# Patient Record
Sex: FEMALE | Race: WHITE | NOT HISPANIC OR LATINO | URBAN - METROPOLITAN AREA
[De-identification: names, ages, dates, MRNs, and addresses within clinical notes are randomized per-mention and may not be internally consistent; named-entity substitution may affect disease eponyms.]

---

## 2017-06-14 ENCOUNTER — CARE COORDINATION (OUTPATIENT)
Dept: SURGERY | Facility: CLINIC | Age: 37
End: 2017-06-14

## 2017-06-14 NOTE — PROGRESS NOTES
"Patient interested in the gastric balloon. Appts scheduled for 6/15/17.    Pre-Intragastric Balloon Screening  Jessica Mckeon  Ht: 5'3\"  Wt: 180  BMI (30-40 to qualify): 31.9  Are you interested in the self pay option for $8,250.00?  yes  Insurance confirmation: BCBS  Are you willing to have us submit insurance claims for medications and visits? yes  Do you have any of the following (no or unknown to meet with surgeon):    y=yes, n=no, u=unknown, na=not applicable  _n_ prior gastrointestinal surgery  _n_ prior weight loss surgery  _n_ ulcers, irritation, inflammation or inflammatory disease of the GI tract (hx of ulcer symptoms > 3 yrs ago, H.Pylori treated, symptoms resolved).  _n_ problems with bleeding or blood clots  _n_ hepatic insufficiency, liver cirrhosis or problems with your liver  _n_ any structural abnormalities or congenital anomalies of the GI tract  _n_ a hiatal hernia > 5 cm or < 5 cm with severe reflux  _n_ problems swallowing (hx of foods getting stuck in back of throat > 1 yr ago, told it was reflux, currently not a problem).  _n_ severe motility disorder (or gastroparesis)  _n_ a stomach mass  _n_ allergic to silicone  _n_ any medical condition that would not permit an elective endoscopy  _n_ breastfeeding, pregnant or planning to become pregnant within 6 months of having the balloon  _n_ serious or uncontrolled psychiatric disorder that could compromise patient understanding or compliance with follow-up visits and removal of device after 6 months  _n_ alcoholism or drug addiction  Are you taking any of the following:  _n_ aspirin         ___per provider  ___not under medical supervision  _n_ anti-inflammatory agents (ie., NSAIDS)   ___per provider  ___not under medical supervision  _n_ anticoagulants (blood thinners)   ___per provider  ___not under medical supervision  _n_ other gastric irritants    ___per provider  ___not under medical supervision  _y_ medication to treat gastric reflux or " "heart burn   ___per provider  _x__not under medical supervision - takes < 1x/mo.  _y__Are you willing to take medication (called a proton pump inhibitor) during the time the balloon is in place that will decrease stomach acid?    Next Steps:  _x__Appt with Dr Randall. (NEW appt, write \"Interested in IntraGastric Balloon\" in the comments).  [place] in Epic and MyChart.  6/15/17 at 10:00am  _x__Appt with Dietician. 6/15/17 at 9:30am  Do pre-visit questionnaire for NEW visit.    ___Toni to email Twyla Henderson at [email or web address] with name, MRN and appt info of patient interested in the gastric balloon.  ___Patient to Call Twyla Henderson (Financial): [facility] in the morning at [phone] or [address] in the afternoon at [phone].  ___Call Edward to schedule, # [phone]  "

## 2017-06-15 ENCOUNTER — ALLIED HEALTH/NURSE VISIT (OUTPATIENT)
Dept: SURGERY | Facility: CLINIC | Age: 37
End: 2017-06-15

## 2017-06-15 ENCOUNTER — OFFICE VISIT (OUTPATIENT)
Dept: SURGERY | Facility: CLINIC | Age: 37
End: 2017-06-15

## 2017-06-15 VITALS
OXYGEN SATURATION: 95 % | BODY MASS INDEX: 31.16 KG/M2 | WEIGHT: 182.5 LBS | HEART RATE: 87 BPM | SYSTOLIC BLOOD PRESSURE: 132 MMHG | HEIGHT: 64 IN | TEMPERATURE: 98.2 F | DIASTOLIC BLOOD PRESSURE: 85 MMHG

## 2017-06-15 DIAGNOSIS — E66.811 OBESITY (BMI 30.0-34.9): Primary | ICD-10-CM

## 2017-06-15 RX ORDER — ACETAMINOPHEN 160 MG
4000 TABLET,DISINTEGRATING ORAL DAILY
COMMUNITY
Start: 2015-11-13

## 2017-06-15 RX ORDER — EPINEPHRINE 0.3 MG/.3ML
INJECTION SUBCUTANEOUS
COMMUNITY
Start: 2015-12-23

## 2017-06-15 RX ORDER — OMEPRAZOLE 40 MG/1
40 CAPSULE, DELAYED RELEASE ORAL DAILY
Qty: 30 CAPSULE | Refills: 1 | Status: SHIPPED | OUTPATIENT
Start: 2017-06-15 | End: 2018-02-15

## 2017-06-15 RX ORDER — CHLORAL HYDRATE 500 MG
900 CAPSULE ORAL DAILY
COMMUNITY

## 2017-06-15 NOTE — LETTER
"6/15/2017       RE: Jessica Mckeon  [address] [place] [address]     Dear Colleague,    Thank you for referring your patient, Jessica Mckeon, to the [facility] SURGICAL WEIGHT MANAGEMENT at [facility]. Please see a copy of my visit note below.    I had the pleasure of seeing Jessica Mckeon in my bariatric consultation clinic to consider intragastric balloon.    Jessica Mckeon is a 37 year old female who is obese.  Numerous attempts to lose weight have been without success. These have included calorie counting and exercise regimens.    Weight gain associated with prior pregnancy.  Currently at highest life weight.      Most recent weights:  Wt Readings from Last 4 Encounters:   06/15/17 182 lb 8 oz       Height: 5' 3.68\" Weight: 182 lb 8 oz Body mass index is 31.64 kg/(m^2).    Weight Classification = class I obesity    SYMPTOMS OF OBESITY: reviewed in weight loss surgery questionnaire (scanned).      There is no problem list on file for this patient.       History reviewed. No pertinent past medical history.    History reviewed. No pertinent surgical history.    Current Outpatient Prescriptions   Medication     Cholecalciferol (VITAMIN D3) 2000 UNITS CAPS     fish oil-omega-3 fatty acids 1000 MG capsule     EPINEPHrine (EPIPEN 2-KHANG) 0.3 MG/0.3ML injection     No current facility-administered medications for this visit.        Cats; No clinical screening - see comments; Pollen extract; and Liquid adhesive     Social History     Social History     Marital status:      Spouse name: N/A     Number of children: N/A     Years of education: N/A     Occupational History     Not on file.     Social History Main Topics     Smoking status: Never Smoker     Smokeless tobacco: Not on file     Alcohol use Not on file     Drug use: Not on file     Sexual activity: Not on file     Other Topics Concern     Not on file     Social History Narrative     No narrative on file        History " "reviewed. No pertinent family history.     PHYSICAL EXAMINATION  /85  Pulse 87  Temp 98.2  F (36.8  C) (Oral)  Ht 5' 3.68\"  Wt 182 lb 8 oz  SpO2 95%  BMI 31.64 kg/m2   Body mass index is 31.64 kg/(m^2).   NAD NCAT  HEENT mucous membranes moist; oral dentition adequate; neck supple; no thyromegaly  Lungs breathing unlabored  Heart no murmurs/gallops/rubs; RR  Abdomen soft; nontender; nondistended; protuberant; peripheral obesity; no hernia  Extremities warm/dry; no deformities  Skin no jaundice; no obvious lesions  Neurological CN 2-12 grossly intact; motor strength equal; sensation grossly intact      No orders of the defined types were placed in this encounter.        A/P:  37 year old female who is obese with BMI about 31 kg/m2.    Diagnosis: Class I obesity    1. Most interested in intragastric balloon intervention.  Reviewed weight loss expectations and reviewed transition plan after balloon at length.  I spoke with Jessica at length about the side effects also of the balloon.  2. Endoscopic approach.  3. Will need to see a registered dietician 1 time.  4. Patient understands that cash pay is necessary for this procedure, which is considered experimental by medical policy of most insurance companies.  5. Schedule \"endoscopic balloon implant\" at time of patient's choosing after she starts ppi therapy for two weeks and after financial clearance provided.  6. Omeprazole prescribed to [facility] pharmacy in [place]; prior authorization may be required for this.  An alternative is to use omeprazole 20 mg twice daily over the counter medication.    No orders of the defined types were placed in this encounter.    Karlos Randall MD      "

## 2017-06-15 NOTE — PROGRESS NOTES
"New Bariatric Nutrition Consultation Note    Jessica Mckeon is a 37 year old female presents today for a gastric ballon consultation. Pt referred by Dr. Randall (6/15/17)    Estimated body mass index is 31.64 kg/(m^2) as calculated from the following:    Height as of an earlier encounter on 6/15/17: 1.617 m (5' 3.68\").    Weight as of an earlier encounter on 6/15/17: 82.8 kg (182 lb 8 oz).      Nutrition history  See MD note for details.  B- cookies and tea(milk skim) protein bar(anthony) toast(butter)  Snack- cookies (3-4)  L- microwave lean cuisine or smart ones, veggie burger(bun & ketchup)  D- pasta, pizza, mexican food  Snacks- candy bar(stopped) chips(not in house anymore)  Frozen food- burrito, microwave dinners, veggie burgers  No milk, juice, pop, light beers 1-3 (3-4 times per week)     Nutrition Prescription  Clear and full liquids post gastric ballon (per MD)  Grams Protein: 60 (minimum)  Amount of Fluid: 48-64 oz    Nutrition Diagnosis  Food- and Nutrition-related knowledge deficit r/t lack of prior exposure to information AEB pt unable to verbalize main points of bariatric clear and low-fat full liquid diet.    Nutrition Intervention  Materials/education provided on clear and full liquid diet.  Diet Guidelines for Bariatric Surgery, Sources of Protein, Keeping Track of Your Fluids, list of recommended vitamin/mineral supplementation.  Patient's questions answered, pt provided with goals for diet advancement post gastric balloon and RD contact information.    Patient Understanding: good  Expected Compliance: good  Follow-Up Plans: 1 week post gastric balloon     Nutrition Goals  1. Follow the bariatric post-op diet advancement schedule:  - Bariatric clear liquid diet through post-op day 1.  - Bariatric low-fat full liquid diet on post-op days 2 through 13 (2 weeks).  2. Sip on 48-64 oz (or greater) fluids daily, recording intake to help stay on-track.  - Drink at least 2 oz of fluid every 30 min.  3. " Protein at each meal - goal of 60 gm per day  4. Three meals per day, avoid snacking    Follow-Up:  PRN    Time spent with patient: 45 minutes.  Unique Esteban RD, LD

## 2017-06-15 NOTE — MR AVS SNAPSHOT
MRN:[id number]                      After Visit Summary   6/15/2017    Jessica Mckeon    MRN: [id number]           Visit Information        Provider Department      6/15/2017 10:30 AM Flavia Ross RD M [facility] Surgical Weight Management        Care Instructions    Goals:   1. Follow the bariatric post-op diet advancement schedule:  - Bariatric clear liquid diet through post-op day 1.  - Bariatric low-fat full liquid diet on post-op days 2 through 13 (2 weeks).  2. Sip on 48-64 oz (or greater) fluids daily, recording intake to help stay on-track.  - Drink at least 2 oz of fluid every 30 min.  3. Protein at each meal - goal of 60 gm per day  4. Three meals per day, avoid snacking         [employer] Information     [employer] is an electronic gateway that provides easy, online access to your medical records. With [employer], you can request a clinic appointment, read your test results, renew a prescription or communicate with your care team.     To sign up for [employer] visit the website at [email or web address]   You will be asked to enter the access code listed below, as well as some personal information. Please follow the directions to create your username and password.     Your access code is: [email or web address]  Expires: 2017  6:30 AM     Your access code will  in 90 days. If you need help or a new code, please contact your [facility] or call [phone] for assistance.        Care EveryWhere ID     This is your Care EveryWhere ID. This could be used by other organizations to access your [place] medical records  [id number]

## 2017-06-15 NOTE — PROGRESS NOTES
"I had the pleasure of seeing Jessica Mckeon in my bariatric consultation clinic to consider intragastric balloon.    Jessica Mckeon is a 37 year old female who is obese.  Numerous attempts to lose weight have been without success. These have included calorie counting and exercise regimens.    Weight gain associated with prior pregnancy.  Currently at highest life weight.      Most recent weights:  Wt Readings from Last 4 Encounters:   06/15/17 182 lb 8 oz       Height: 5' 3.68\" Weight: 182 lb 8 oz Body mass index is 31.64 kg/(m^2).    Weight Classification = class I obesity    SYMPTOMS OF OBESITY: reviewed in weight loss surgery questionnaire (scanned).      There is no problem list on file for this patient.       History reviewed. No pertinent past medical history.    History reviewed. No pertinent surgical history.    Current Outpatient Prescriptions   Medication     Cholecalciferol (VITAMIN D3) 2000 UNITS CAPS     fish oil-omega-3 fatty acids 1000 MG capsule     EPINEPHrine (EPIPEN 2-KHANG) 0.3 MG/0.3ML injection     No current facility-administered medications for this visit.        Cats; No clinical screening - see comments; Pollen extract; and Liquid adhesive     Social History     Social History     Marital status:      Spouse name: N/A     Number of children: N/A     Years of education: N/A     Occupational History     Not on file.     Social History Main Topics     Smoking status: Never Smoker     Smokeless tobacco: Not on file     Alcohol use Not on file     Drug use: Not on file     Sexual activity: Not on file     Other Topics Concern     Not on file     Social History Narrative     No narrative on file        History reviewed. No pertinent family history.     PHYSICAL EXAMINATION  /85  Pulse 87  Temp 98.2  F (36.8  C) (Oral)  Ht 5' 3.68\"  Wt 182 lb 8 oz  SpO2 95%  BMI 31.64 kg/m2   Body mass index is 31.64 kg/(m^2).   NAD NCAT  HEENT mucous membranes moist; oral dentition adequate; neck " "supple; no thyromegaly  Lungs breathing unlabored  Heart no murmurs/gallops/rubs; RR  Abdomen soft; nontender; nondistended; protuberant; peripheral obesity; no hernia  Extremities warm/dry; no deformities  Skin no jaundice; no obvious lesions  Neurological CN 2-12 grossly intact; motor strength equal; sensation grossly intact      No orders of the defined types were placed in this encounter.        A/P:  37 year old female who is obese with BMI about 31 kg/m2.    Diagnosis: Class I obesity    1. Most interested in intragastric balloon intervention.  Reviewed weight loss expectations and reviewed transition plan after balloon at length.  I spoke with Jessica at length about the side effects also of the balloon.  2. Endoscopic approach.  3. Will need to see a registered dietician 1 time.  4. Patient understands that cash pay is necessary for this procedure, which is considered experimental by medical policy of most insurance companies.  5. Schedule \"endoscopic balloon implant\" at time of patient's choosing after she starts ppi therapy for two weeks and after financial clearance provided.  6. [facility]ole prescribed to [facility] in [place]; prior authorization may be required for this.  An alternative is to use omeprazole 20 mg twice daily over the counter medication.    No orders of the defined types were placed in this encounter.          Karlos Randall MD  Surgery  [phone] (hospital )  [phone] (clinic nurses)                  "

## 2017-06-15 NOTE — NURSING NOTE
"(   Chief Complaint   Patient presents with     Consult     Consult for Gastric Balloon.    )    ( Weight: 182 lb 8 oz )  ( Height: 5' 3.68\" )  ( BMI (Calculated): 31.71 )  ( Initial Weight: 182 lb 8 oz )  ( Cumulative weight loss (lbs): 0 )  (   )  (   )  ( Waist Circumference (cm): 109 cm )  (   )    ( BP: 132/85 )  (   )  ( Temp: 98.2  F (36.8  C) )  ( Temp src: Oral )  ( Pulse: 87 )  (   )  ( SpO2: 95 % )    ( There is no problem list on file for this patient.   )  (   Current Outpatient Prescriptions   Medication Sig Dispense Refill     Cholecalciferol (VITAMIN D3) 2000 UNITS CAPS Take 4,000 Units by mouth       fish oil-omega-3 fatty acids 1000 MG capsule Take 900 mg by mouth       EPINEPHrine (EPIPEN 2-KHANG) 0.3 MG/0.3ML injection       )  ( Diabetes Eval:    )    ( Pain Eval:  Data Unavailable )    ( Wound Eval:       )    (   History   Smoking Status     Never Smoker   Smokeless Tobacco     Not on file    )    ( Signed By:  Ty Watkins; Carly 15, 2017; 10:28 AM )                          "

## 2017-06-15 NOTE — MR AVS SNAPSHOT
After Visit Summary   6/15/2017    Jessica Mckeon    MRN: [id number]           Patient Information     Date Of Birth          1980        Visit Information        Provider Department      6/15/2017 10:00 AM Karlos Randall MD [facility] Surgical Weight Management        Today's Diagnoses     Obesity (BMI 30.0-34.9)    -  1      Care Instructions    ___Toni to email Twyla Brownen at [email or web address] with name, MRN and appt info of patient interested in the gastric balloon.  ___Patient to Call Twyla Henderson (Financial): [facility] in the morning at [phone] or [address] in the afternoon at [phone].  ___Call Edward to schedule, # [phone]          Follow-ups after your visit        Who to contact     Please call your clinic at [phone] to:    Ask questions about your health    Make or cancel appointments    Discuss your medicines    Learn about your test results    Speak to your doctor   If you have compliments or concerns about an experience at your clinic, or if you wish to file a complaint, please contact [facility] at [phone] or email us at [email or web address]         Additional Information About Your Visit        MyChart Information     [employer] is an electronic gateway that provides easy, online access to your medical records. With [employer], you can request a clinic appointment, read your test results, renew a prescription or communicate with your care team.     To sign up for [employer] visit the website at [email or web address]   You will be asked to enter the access code listed below, as well as some personal information. Please follow the directions to create your username and password.     Your access code is: [email or web address]  Expires: 2017  6:30 AM     Your access code will  in 90 days. If you need help or a new code, please contact your [facility] or call  "[phone] for assistance.        Care EveryWhere ID     This is your Care EveryWhere ID. This could be used by other organizations to access your [place] medical records  [id number]        Your Vitals Were     Pulse Temperature Height Pulse Oximetry BMI (Body Mass Index)       87 98.2  F (36.8  C) (Oral) 5' 3.68\" 95% 31.64 kg/m2        Blood Pressure from Last 3 Encounters:   06/15/17 132/85    Weight from Last 3 Encounters:   06/15/17 182 lb 8 oz              Today, you had the following     No orders found for display       Primary Care Provider Office Phone # Fax #    Jose Guadalupe Luevano [phone]       [facility] [address]  [facility] [address]        Thank you!     Thank you for choosing [facility] SURGICAL WEIGHT MANAGEMENT  for your care. Our goal is always to provide you with excellent care. Hearing back from our patients is one way we can continue to improve our services. Please take a few minutes to complete the written survey that you may receive in the mail after your visit with us. Thank you!             Your Updated Medication List - Protect others around you: Learn how to safely use, store and throw away your medicines at www.disposemymeds.org.          This list is accurate as of: 6/15/17 10:45 AM.  Always use your most recent med list.                   Brand Name Dispense Instructions for use    EPIPEN 2-KHANG 0.3 MG/0.3ML injection   Generic drug:  EPINEPHrine          fish oil-omega-3 fatty acids 1000 MG capsule      Take 900 mg by mouth       vitamin D3 2000 UNITS Caps      Take 4,000 Units by mouth         "

## 2017-06-15 NOTE — PATIENT INSTRUCTIONS
Goals:   1. Follow the bariatric post-op diet advancement schedule:  - Bariatric clear liquid diet through post-op day 1.  - Bariatric low-fat full liquid diet on post-op days 2 through 13 (2 weeks).  2. Sip on 48-64 oz (or greater) fluids daily, recording intake to help stay on-track.  - Drink at least 2 oz of fluid every 30 min.  3. Protein at each meal - goal of 60 gm per day  4. Three meals per day, avoid snacking

## 2017-06-15 NOTE — PATIENT INSTRUCTIONS
___Toni to email Twyla Henderson at [email or web address] with name, MRN and appt info of patient interested in the gastric balloon.  ___Patient to Call Twyla Henderson (Financial): [facility] in the morning at [phone] or [address] in the afternoon at [phone].  ___Call Edward to schedule, # [phone]

## 2017-07-07 RX ORDER — FERROUS SULFATE 325(65) MG
TABLET ORAL
COMMUNITY

## 2017-07-11 ENCOUNTER — ANESTHESIA EVENT (OUTPATIENT)
Dept: SURGERY | Facility: AMBULATORY SURGERY CENTER | Age: 37
End: 2017-07-11

## 2017-07-12 RX ORDER — ACETAMINOPHEN 325 MG/1
975 TABLET ORAL ONCE
Status: DISCONTINUED | OUTPATIENT
Start: 2017-07-12 | End: 2017-07-12 | Stop reason: CLARIF

## 2017-07-12 RX ORDER — GABAPENTIN 300 MG/1
300 CAPSULE ORAL ONCE
Status: DISCONTINUED | OUTPATIENT
Start: 2017-07-12 | End: 2017-07-12 | Stop reason: CLARIF

## 2017-07-13 ENCOUNTER — HOSPITAL ENCOUNTER (OUTPATIENT)
Facility: AMBULATORY SURGERY CENTER | Age: 37
End: 2017-07-13
Attending: SURGERY

## 2017-07-13 ENCOUNTER — ANESTHESIA (OUTPATIENT)
Dept: SURGERY | Facility: AMBULATORY SURGERY CENTER | Age: 37
End: 2017-07-13

## 2017-07-13 VITALS
OXYGEN SATURATION: 99 % | DIASTOLIC BLOOD PRESSURE: 88 MMHG | HEART RATE: 70 BPM | BODY MASS INDEX: 31.89 KG/M2 | WEIGHT: 180 LBS | RESPIRATION RATE: 16 BRPM | SYSTOLIC BLOOD PRESSURE: 130 MMHG | HEIGHT: 63 IN | TEMPERATURE: 97.2 F

## 2017-07-13 DIAGNOSIS — E66.09 OBESITY DUE TO EXCESS CALORIES, UNSPECIFIED OBESITY SEVERITY: Primary | ICD-10-CM

## 2017-07-13 LAB
HCG UR QL: NEGATIVE
INTERNAL QC OK POCT: YES

## 2017-07-13 DEVICE — IMPLANTABLE DEVICE: Type: IMPLANTABLE DEVICE | Site: STOMACH | Status: FUNCTIONAL

## 2017-07-13 RX ORDER — HYOSCYAMINE SULFATE 0.125 MG
0.125-0.25 TABLET ORAL EVERY 4 HOURS PRN
Qty: 50 TABLET | Refills: 1 | Status: SHIPPED | OUTPATIENT
Start: 2017-07-13 | End: 2018-02-13

## 2017-07-13 RX ORDER — SODIUM CHLORIDE, SODIUM LACTATE, POTASSIUM CHLORIDE, CALCIUM CHLORIDE 600; 310; 30; 20 MG/100ML; MG/100ML; MG/100ML; MG/100ML
INJECTION, SOLUTION INTRAVENOUS CONTINUOUS
Status: DISCONTINUED | OUTPATIENT
Start: 2017-07-13 | End: 2017-07-13 | Stop reason: HOSPADM

## 2017-07-13 RX ORDER — SODIUM CHLORIDE, SODIUM LACTATE, POTASSIUM CHLORIDE, CALCIUM CHLORIDE 600; 310; 30; 20 MG/100ML; MG/100ML; MG/100ML; MG/100ML
INJECTION, SOLUTION INTRAVENOUS CONTINUOUS
Status: DISCONTINUED | OUTPATIENT
Start: 2017-07-13 | End: 2017-07-14 | Stop reason: HOSPADM

## 2017-07-13 RX ORDER — ONDANSETRON 4 MG/1
4 TABLET, ORALLY DISINTEGRATING ORAL
Status: DISCONTINUED | OUTPATIENT
Start: 2017-07-13 | End: 2017-07-14 | Stop reason: HOSPADM

## 2017-07-13 RX ORDER — FENTANYL CITRATE 50 UG/ML
25-50 INJECTION, SOLUTION INTRAMUSCULAR; INTRAVENOUS
Status: DISCONTINUED | OUTPATIENT
Start: 2017-07-13 | End: 2017-07-13 | Stop reason: HOSPADM

## 2017-07-13 RX ORDER — PROPOFOL 10 MG/ML
INJECTION, EMULSION INTRAVENOUS CONTINUOUS PRN
Status: DISCONTINUED | OUTPATIENT
Start: 2017-07-13 | End: 2017-07-13

## 2017-07-13 RX ORDER — ONDANSETRON 4 MG/1
4 TABLET, ORALLY DISINTEGRATING ORAL EVERY 6 HOURS PRN
Qty: 50 TABLET | Refills: 1 | Status: SHIPPED | OUTPATIENT
Start: 2017-07-13 | End: 2018-02-13

## 2017-07-13 RX ORDER — DEXAMETHASONE SODIUM PHOSPHATE 4 MG/ML
INJECTION, SOLUTION INTRA-ARTICULAR; INTRALESIONAL; INTRAMUSCULAR; INTRAVENOUS; SOFT TISSUE PRN
Status: DISCONTINUED | OUTPATIENT
Start: 2017-07-13 | End: 2017-07-13

## 2017-07-13 RX ORDER — LIDOCAINE HYDROCHLORIDE 20 MG/ML
INJECTION, SOLUTION INFILTRATION; PERINEURAL PRN
Status: DISCONTINUED | OUTPATIENT
Start: 2017-07-13 | End: 2017-07-13

## 2017-07-13 RX ORDER — SCOLOPAMINE TRANSDERMAL SYSTEM 1 MG/1
PATCH, EXTENDED RELEASE TRANSDERMAL
Qty: 4 PATCH | Refills: 0 | Status: SHIPPED | OUTPATIENT
Start: 2017-07-13 | End: 2018-02-13

## 2017-07-13 RX ORDER — ONDANSETRON 4 MG/1
4 TABLET, ORALLY DISINTEGRATING ORAL EVERY 30 MIN PRN
Status: DISCONTINUED | OUTPATIENT
Start: 2017-07-13 | End: 2017-07-14 | Stop reason: HOSPADM

## 2017-07-13 RX ORDER — ONDANSETRON 2 MG/ML
INJECTION INTRAMUSCULAR; INTRAVENOUS PRN
Status: DISCONTINUED | OUTPATIENT
Start: 2017-07-13 | End: 2017-07-13

## 2017-07-13 RX ORDER — NALOXONE HYDROCHLORIDE 0.4 MG/ML
.1-.4 INJECTION, SOLUTION INTRAMUSCULAR; INTRAVENOUS; SUBCUTANEOUS
Status: DISCONTINUED | OUTPATIENT
Start: 2017-07-13 | End: 2017-07-14 | Stop reason: HOSPADM

## 2017-07-13 RX ORDER — GLYCOPYRROLATE 0.2 MG/ML
INJECTION, SOLUTION INTRAMUSCULAR; INTRAVENOUS PRN
Status: DISCONTINUED | OUTPATIENT
Start: 2017-07-13 | End: 2017-07-13

## 2017-07-13 RX ORDER — LIDOCAINE 40 MG/G
CREAM TOPICAL
Status: DISCONTINUED | OUTPATIENT
Start: 2017-07-13 | End: 2017-07-13 | Stop reason: HOSPADM

## 2017-07-13 RX ORDER — KETOROLAC TROMETHAMINE 30 MG/ML
30 INJECTION, SOLUTION INTRAMUSCULAR; INTRAVENOUS EVERY 6 HOURS PRN
Status: DISCONTINUED | OUTPATIENT
Start: 2017-07-13 | End: 2017-07-14 | Stop reason: HOSPADM

## 2017-07-13 RX ORDER — FENTANYL CITRATE 50 UG/ML
INJECTION, SOLUTION INTRAMUSCULAR; INTRAVENOUS PRN
Status: DISCONTINUED | OUTPATIENT
Start: 2017-07-13 | End: 2017-07-13

## 2017-07-13 RX ORDER — PROPOFOL 10 MG/ML
INJECTION, EMULSION INTRAVENOUS PRN
Status: DISCONTINUED | OUTPATIENT
Start: 2017-07-13 | End: 2017-07-13

## 2017-07-13 RX ORDER — MEPERIDINE HYDROCHLORIDE 25 MG/ML
12.5 INJECTION INTRAMUSCULAR; INTRAVENOUS; SUBCUTANEOUS
Status: DISCONTINUED | OUTPATIENT
Start: 2017-07-13 | End: 2017-07-14 | Stop reason: HOSPADM

## 2017-07-13 RX ORDER — ONDANSETRON 2 MG/ML
4 INJECTION INTRAMUSCULAR; INTRAVENOUS EVERY 30 MIN PRN
Status: DISCONTINUED | OUTPATIENT
Start: 2017-07-13 | End: 2017-07-14 | Stop reason: HOSPADM

## 2017-07-13 RX ADMIN — FENTANYL CITRATE 50 MCG: 50 INJECTION, SOLUTION INTRAMUSCULAR; INTRAVENOUS at 10:23

## 2017-07-13 RX ADMIN — PROPOFOL 150 MG: 10 INJECTION, EMULSION INTRAVENOUS at 10:21

## 2017-07-13 RX ADMIN — SODIUM CHLORIDE, SODIUM LACTATE, POTASSIUM CHLORIDE, CALCIUM CHLORIDE: 600; 310; 30; 20 INJECTION, SOLUTION INTRAVENOUS at 10:15

## 2017-07-13 RX ADMIN — ONDANSETRON 4 MG: 2 INJECTION INTRAMUSCULAR; INTRAVENOUS at 10:21

## 2017-07-13 RX ADMIN — GLYCOPYRROLATE 0.3 MG: 0.2 INJECTION, SOLUTION INTRAMUSCULAR; INTRAVENOUS at 10:26

## 2017-07-13 RX ADMIN — DEXAMETHASONE SODIUM PHOSPHATE 4 MG: 4 INJECTION, SOLUTION INTRA-ARTICULAR; INTRALESIONAL; INTRAMUSCULAR; INTRAVENOUS; SOFT TISSUE at 10:21

## 2017-07-13 RX ADMIN — PROPOFOL 300 MCG/KG/MIN: 10 INJECTION, EMULSION INTRAVENOUS at 10:22

## 2017-07-13 RX ADMIN — LIDOCAINE HYDROCHLORIDE 100 MG: 20 INJECTION, SOLUTION INFILTRATION; PERINEURAL at 10:21

## 2017-07-13 NOTE — ANESTHESIA POSTPROCEDURE EVALUATION
Patient: Jessica Mckeon    Procedure(s):  Upper Endoscopy with Intragastric Balloon Placement - Wound Class: II-Clean Contaminated    Diagnosis:Obesity  Diagnosis Additional Information: No value filed.    Anesthesia Type:  General, ETT    Note:  Anesthesia Post Evaluation    Patient location during evaluation: bedside  Patient participation: Able to fully participate in evaluation  Level of consciousness: awake  Pain management: adequate  Airway patency: patent  Cardiovascular status: acceptable  Respiratory status: acceptable  Hydration status: acceptable  PONV: controlled     Anesthetic complications: None          Last vitals:  Vitals:    07/13/17 0903 07/13/17 1047 07/13/17 1056   BP: 114/89 105/75 (!) 130/91   Pulse: 70     Resp: 16 16 16   Temp: 36.4  C (97.6  F) 36.2  C (97.2  F)    SpO2: 97% 93% 92%         Electronically Signed By: Sarbjit Hubbard MD, MD  July 13, 2017  11:06 AM

## 2017-07-13 NOTE — OP NOTE
"   [facility]    Operative Note    Pre-operative diagnosis: Obesity   Post-operative diagnosis same   Procedure: Procedure(s):  Upper Endoscopy with Intragastric Balloon Placement - Wound Class: II-Clean Contaminated   Surgeon: Surgeon(s) and Role:     * Karlos Randall MD - Primary       * Betsy Roth MD - resident assisting   Anesthesia: Monitor Anesthesia Care    Estimated blood loss: None   Drains: None   Specimens: * No specimens in log *   Findings: normal gastric anatomy.   Complications: None.   Implants: Intragastric balloon placed     COMORBIDITIES:   Past Medical History:   Diagnosis Date     Obesity      Uncomplicated asthma        OPERATIVE INDICATIONS: Jessica Mckeon is a 37 year old female who is obese and has attempted to lose weight numerous times without success.    Height: 160 cm (5' 3\") Weight: 81.6 kg (180 lb) Body mass index is 31.89 kg/(m^2)..  After understanding the risks and benefits of proceeding with EGD with intragastric balloon placement, she agreed to the procedure.    OPERATIVE PROCEDURE:    A timeout procedure was performed.  The patient was positioned supine under MAC anesthesia.  Oral bite block placed and endoscope (28Fr) introduced orally and advanced through esophagus, stomach, and to the jejunum.   Normal esophageal and gastric anatomy was identified. She appeared to have a small hiatal hernia.  The scope was withdrawn and the deflated intragastric balloon was inserted into her oralpharynx and down into her stomach. The scope was replaced and retroflexed after getting into the stomach. The balloon was filled with 700cc of sterile saline under visualization with the endoscope. The balloon insertion device was disconnected from the balloon and removed from the esophagus.The balloon was visualized from many angles and felt to sit well in the stomach.  After this, the gastroscope was withdrawn and the case concludded.  I was " present for all parts of this case.     Karlos Randall MD  Surgery  [phone] (hospital )  [phone] (clinic nurses)

## 2017-07-13 NOTE — IP AVS SNAPSHOT
MRN:[id number]                      After Visit Summary   7/13/2017    Jessica Mckeon    MRN: [id number]           Thank you!     Thank you for choosing [facility] for your care. Our goal is always to provide you with excellent care. Hearing back from our patients is one way we can continue to improve our services. Please take a few minutes to complete the written survey that you may receive in the mail after you visit with us. Thank you!        Patient Information     Date Of Birth          1980        About your hospital stay     You were admitted on:  July 13, 2017 You last received care in the:  [facility]    You were discharged on:  July 13, 2017       Who to Call     For medical emergencies, please call 911.  For non-urgent questions about your medical care, please call your primary care provider or clinic, [phone]  For questions related to your surgery, please call your surgery clinic        Attending Provider     Provider Specialty    Karlos Randall MD Surgery       Primary Care Provider Office Phone # Fax #    Jose Guadalupe Luevano [phone]      After Care Instructions     Diet Instructions       Clear liquid diet for 2 days then full liquid diet for 2 weeks            Discharge Instructions       Follow up appointment as instructed by Surgeon and or RN            Shower       Resume normal showering            Weight bearing status - As tolerated                 Your next 10 appointments already scheduled     Jul 20, 2017 10:30 AM CDT   NUTRITION VISIT with Flavia Ross RD   [facility] ([facility])    [address] [address] [phone]              Further instructions from your care team       [facility]  Home Care Following Anesthesia  For 24 hours after surgery:  1. Get plenty of rest.  A responsible  adult must stay with you for at least 24 hours after you leave the surgery center.  2. Do not drive or use heavy equipment.  If you have weakness or tingling, don't drive or use heavy equipment until this feeling goes away.   3. Do not drink alcohol.   4. Avoid strenuous or risky activities.  Ask for help when climbing stairs.  5. You may feel lightheaded.  IF so, sit for a few minutes before standing.  Have someone help you get up.   6. If you have nausea (feel sick to your stomach): Drink only clear liquids such as apple juice, ginger ale, broth or 7-Up.  Rest may also help.  Be sure to drink enough fluids.  Move to a regular diet as you feel able.   7. You may have a slight fever.  Call the doctor if your fever is over 100 F (37.7 C) (taken under the tongue) or lasts longer than 24 hours.  8. You may have a dry mouth, a sore throat, muscle aches or trouble sleeping. These should go away after 24 hours.  9. Do not make important or legal decisions.         Tips for taking pain medications  To get the best pain relief possible, remember these points:    Take pain medications as directed, before pain becomes severe.    Pain medication can upset your stomach: taking it with food may help.    Constipation is a common side effect of pain medication. Drink plenty of  fluids.    Eat foods high in fiber. Take a stool softener if recommended by your doctor or pharmacist.    Do not drink alcohol, drive or operate machinery while taking pain medications.    Ask about other ways to control pain, such as with heat, ice or relaxation.    Call a doctor for any of the followin. Signs of infection (fever, growing tenderness at the surgery site, a large amount of drainage or bleeding, severe pain, foul-smelling drainage, redness, swelling).  2. It has been over 8 to 10 hours since surgery and you are still not able to urinate (pass water).  3. Headache for over 24 hours.    Your doctor is:       Dr. Karlos Randall: [phone]  "               Or dial [phone] and ask for the resident on call for:  General Surgery  For emergency care, call the:  [facility]:  [phone] (TTY for hearing impaired: [phone])                Pending Results     No orders found from 2017 to 2017.            Admission Information     Date & Time Provider Department Dept. Phone    2017 Karlos Randall MD [facility] [phone]      Your Vitals Were     Blood Pressure Pulse Temperature Respirations Height Weight    130/91 70 97.2  F (36.2  C) (Temporal) 16 1.6 m (5' 3\") 81.6 kg (180 lb)    Last Period Pulse Oximetry BMI (Body Mass Index)             2017 92% 31.89 kg/m2         [employer] Information     [employer] is an electronic gateway that provides easy, online access to your medical records. With [employer], you can request a clinic appointment, read your test results, renew a prescription or communicate with your care team.     To sign up for [employer] visit the website at [email or web address]   You will be asked to enter the access code listed below, as well as some personal information. Please follow the directions to create your username and password.     Your access code is: [email or web address]  Expires: 2017  6:30 AM     Your access code will  in 90 days. If you need help or a new code, please contact your [facility] or call [phone] for assistance.        Care EveryWhere ID     This is your Care EveryWhere ID. This could be used by other organizations to access your [facility] medical records  [id number]        Equal Access to Services     APRIL CHAUDHARY : Hadii aad destiny colon Sogmali, waaxda luqadaha, qaybta kaalmada adeegyada, chantelle garber. So [facility] [phone].    ATENCIÓN: Si habla español, tiene a alejandro disposición servicios gratuitos de asistencia lingüística. Llame al [phone].    We comply with " applicable federal civil rights laws and [place] laws. We do not discriminate on the basis of race, color, national origin, age, disability sex, sexual orientation or gender identity.               Review of your medicines      START taking        Dose / Directions    hyoscyamine 0.125 MG tablet   Commonly known as:  ANASPAZ/LEVSIN   Used for:  Obesity due to excess calories, unspecified obesity severity        Dose:  0.125-0.25 mg   Take 1-2 tablets (125-250 mcg) by mouth every 4 hours as needed for cramping   Quantity:  50 tablet   Refills:  1       ondansetron 4 MG ODT tab   Commonly known as:  ZOFRAN-ODT   Used for:  Obesity due to excess calories, unspecified obesity severity        Dose:  4 mg   Take 1 tablet (4 mg) by mouth every 6 hours as needed for nausea Dissolve ON the tongue.   Quantity:  50 tablet   Refills:  1       scopolamine 72 hr patch   Commonly known as:  TRANSDERM   Used for:  Obesity due to excess calories, unspecified obesity severity        Apply 1 patch to hairless area behind one ear as needed for nausea.  Remove old patch and change every 3 days (72 hours).   Quantity:  4 patch   Refills:  0         CONTINUE these medicines which have NOT CHANGED        Dose / Directions    EPIPEN 2-KHANG 0.3 MG/0.3ML injection   Generic drug:  EPINEPHrine        Refills:  0       ferrous sulfate 325 (65 FE) MG tablet   Commonly known as:  IRON        Take by mouth daily (with breakfast)   Refills:  0       fish oil-omega-3 fatty acids 1000 MG capsule        Dose:  900 mg   Take 900 mg by mouth daily   Refills:  0       omeprazole 40 MG capsule   Commonly known as:  priLOSEC   Used for:  Obesity (BMI 30.0-34.9)        Dose:  40 mg   Take 1 capsule (40 mg) by mouth daily   Quantity:  30 capsule   Refills:  1       vitamin D3 2000 UNITS Caps        Dose:  4000 Units   Take 4,000 Units by mouth daily   Refills:  0            Where to get your medicines      These medications were sent to [facility]  [place], [place] - [address]  [address], [place] [address]    Hours:  TRANSPLANT PHONE NUMBER [phone] Phone:  [phone]     hyoscyamine 0.125 MG tablet    ondansetron 4 MG ODT tab    scopolamine 72 hr patch                Protect others around you: Learn how to safely use, store and throw away your medicines at www.disposemymeds.org.             Medication List: This is a list of all your medications and when to take them. Check marks below indicate your daily home schedule. Keep this list as a reference.      Medications           Morning Afternoon Evening Bedtime As Needed    EPIPEN 2-KHANG 0.3 MG/0.3ML injection   Generic drug:  EPINEPHrine                                ferrous sulfate 325 (65 FE) MG tablet   Commonly known as:  IRON   Take by mouth daily (with breakfast)                                fish oil-omega-3 fatty acids 1000 MG capsule   Take 900 mg by mouth daily                                hyoscyamine 0.125 MG tablet   Commonly known as:  ANASPAZ/LEVSIN   Take 1-2 tablets (125-250 mcg) by mouth every 4 hours as needed for cramping                                omeprazole 40 MG capsule   Commonly known as:  priLOSEC   Take 1 capsule (40 mg) by mouth daily                                ondansetron 4 MG ODT tab   Commonly known as:  ZOFRAN-ODT   Take 1 tablet (4 mg) by mouth every 6 hours as needed for nausea Dissolve ON the tongue.                                scopolamine 72 hr patch   Commonly known as:  TRANSDERM   Apply 1 patch to hairless area behind one ear as needed for nausea.  Remove old patch and change every 3 days (72 hours).                                vitamin D3 2000 UNITS Caps   Take 4,000 Units by mouth daily

## 2017-07-13 NOTE — ANESTHESIA PREPROCEDURE EVALUATION
Anesthesia Evaluation     . Pt has had prior anesthetic. Type: General    No history of anesthetic complications          ROS/MED HX    ENT/Pulmonary:     (+)Intermittent asthma , . .    Neurologic:  - neg neurologic ROS     Cardiovascular:  - neg cardiovascular ROS       METS/Exercise Tolerance:  >4 METS   Hematologic:  - neg hematologic  ROS       Musculoskeletal:  - neg musculoskeletal ROS       GI/Hepatic:  - neg GI/hepatic ROS       Renal/Genitourinary:  - ROS Renal section negative       Endo:  - neg endo ROS       Psychiatric:  - neg psychiatric ROS       Infectious Disease:  - neg infectious disease ROS       Malignancy:         Other:                     Physical Exam  Normal systems: dental    Airway   Mallampati: I  TM distance: >3 FB  Neck ROM: full    Dental     Cardiovascular   Rhythm and rate: regular and normal      Pulmonary    breath sounds clear to auscultation                    Anesthesia Plan      History & Physical Review  History and physical reviewed and following examination; no interval change.    ASA Status:  1 .    NPO Status:  > 6 hours    Plan for General and ETT with Intravenous induction. Maintenance will be TIVA.    PONV prophylaxis:  Ondansetron (or other 5HT-3) and Dexamethasone or Solumedrol       Postoperative Care  Postoperative pain management:  Oral pain medications and Multi-modal analgesia.      Consents  Anesthetic plan, risks, benefits and alternatives discussed with:  Patient..                          .

## 2017-07-13 NOTE — BRIEF OP NOTE
[facility]    Brief Operative Note    Pre-operative diagnosis: Obesity  Post-operative diagnosis same  Procedure: Procedure(s):  Upper Endoscopy with Intragastric Balloon Placement - Wound Class: II-Clean Contaminated  Surgeon: Surgeon(s) and Role:     * Karlos Randall MD - Primary  Assistant: genna serrato MD - surgery resident  Anesthesia: Monitor Anesthesia Care   Estimated blood loss: None  Drains: None  Specimens: * No specimens in log *  Findings:   normal gastric anatomy.  Complications: None.  Implants: Materials Involved:  Intragastric balloon filled to 700cc with saline  Grafts:  None.

## 2017-07-13 NOTE — IP AVS SNAPSHOT
[facility]    [address] [place] [address]    Phone:  [phone]    Fax:  [phone]                                       After Visit Summary   7/13/2017    Jessica Mckeon    MRN: [id number]           After Visit Summary Signature Page     I have received my discharge instructions, and my questions have been answered. I have discussed any challenges I see with this plan with the nurse or doctor.    ..........................................................................................................................................  Patient/Patient Representative Signature      ..........................................................................................................................................  Patient Representative Print Name and Relationship to Patient    ..................................................               ................................................  Date                                            Time    ..........................................................................................................................................  Reviewed by Signature/Title    ...................................................              ..............................................  Date                                                            Time

## 2017-07-13 NOTE — H&P
Bariatric Surgery History & Physical    Jessica Mckeon  MRN: [id number]  female  37 year old    Assessment & Plan:  36yo F w/ obesity here for upper endoscopy with intragastric balloon placement today performed by Dr. Randall. Same day procedure.    Chief Complaint:  Class I obesity    HPI:  Jessica Mckeon is a 37 year old female who is obese. Numerous attempts to lose weight have been without success. These have included calorie counting and exercise regimens. Weight gain associated with prior pregnancy. Currently at highest life weight. Saw Dr. Randall in clinic on 6/15/2017 and decided to pursue intragastric balloon intervention.    Past Surgical History:   Past Surgical History:   Procedure Laterality Date      SECTION      x 2       Past Medical History:   Past Medical History:   Diagnosis Date     Obesity      Uncomplicated asthma        Social History:   Social History   Substance Use Topics     Smoking status: Never Smoker     Smokeless tobacco: Not on file     Alcohol use Yes      Comment: rare       Family History: History reviewed. No pertinent family history.     Allergies:   Allergies   Allergen Reactions     Cats      Other reaction(s): Runny Nose     No Clinical Screening - See Comments      Other reaction(s): Runny Nose  Trees, grasses  Other reaction(s): Runny Nose     Pollen Extract      Other reaction(s): Runny Nose     Liquid Adhesive Rash       Active Medications:   Current Outpatient Prescriptions   Medication Sig Dispense Refill     ferrous sulfate (IRON) 325 (65 FE) MG tablet Take by mouth daily (with breakfast)       Cholecalciferol (VITAMIN D3) 2000 UNITS CAPS Take 4,000 Units by mouth daily        fish oil-omega-3 fatty acids 1000 MG capsule Take 900 mg by mouth daily        omeprazole (PRILOSEC) 40 MG capsule Take 1 capsule (40 mg) by mouth daily 30 capsule 1     EPINEPHrine (EPIPEN 2-KHANG) 0.3 MG/0.3ML injection          ROS:  Negative for: recent illnesses, nausea, vomiting,  "diarrhea, chest pain, shortness of breath, headaches  Patient feels healthy overall    Physical Examination:   Vital Signs: /89  Pulse 70  Temp 97.6  F (36.4  C) (Oral)  Resp 16  Ht 1.6 m (5' 3\")  Wt 81.6 kg (180 lb)  LMP 07/05/2017  SpO2 97%  BMI 31.89 kg/m2  GEN:  alert, healthy, no distress, well nourished  EENT: normal  Heart/Pulse: regular rate & rhythm, no murmurs and no gallops  Lungs/Chest: lungs clear to auscultation  Abdomen: abdomen soft, non-tender, no abnormal masses  Extremeties: no joint deformities, effusion, and inflammation      Arabella Polk MD/MPH  Integrated Plastic Surgery PGY1  "

## 2017-07-13 NOTE — ANESTHESIA CARE TRANSFER NOTE
Patient: Jessica Mckeon    Procedure(s):  Upper Endoscopy with Intragastric Balloon Placement - Wound Class: II-Clean Contaminated    Diagnosis: Obesity  Diagnosis Additional Information: No value filed.    Anesthesia Type:   General, ETT     Note:  Airway :Room Air  Patient transferred to:Phase II        Vitals: (Last set prior to Anesthesia Care Transfer)    CRNA VITALS  7/13/2017 1012 - 7/13/2017 1049      7/13/2017             EKG: Sinus rhythm                Electronically Signed By: LUIS A Quijano CRNA  July 13, 2017  10:49 AM

## 2017-07-24 ENCOUNTER — CARE COORDINATION (OUTPATIENT)
Dept: SURGERY | Facility: CLINIC | Age: 37
End: 2017-07-24

## 2017-07-24 NOTE — PROGRESS NOTES
Patient called and left message. She is having severe constipation and would like to know what she can take for this.     Called patient. Left message.     She can take Miralax or use a suppository. She has no restrictions as of now on her diet.

## 2017-10-01 ENCOUNTER — HEALTH MAINTENANCE LETTER (OUTPATIENT)
Age: 37
End: 2017-10-01

## 2018-01-12 DIAGNOSIS — Z98.84 BARIATRIC SURGERY STATUS: Primary | ICD-10-CM

## 2018-02-13 NOTE — OR NURSING
"PAC pre-op phone call done. Pt states she had her pre-op last week at [facility] in [place]. It was done by her  who is a physician. She said \"Don't worry about the pre-op. It's been done and faxed in to Dr Randall's office\". I explained I do not see it in the record. The pt requested that we not call [facility] and ask for the H&P record because of privacy issues. She said she will hand carry the H&P in on DOS.  "

## 2018-02-14 ENCOUNTER — ANESTHESIA EVENT (OUTPATIENT)
Dept: SURGERY | Facility: AMBULATORY SURGERY CENTER | Age: 38
End: 2018-02-14

## 2018-02-15 ENCOUNTER — ANESTHESIA (OUTPATIENT)
Dept: SURGERY | Facility: AMBULATORY SURGERY CENTER | Age: 38
End: 2018-02-15

## 2018-02-15 ENCOUNTER — SURGERY (OUTPATIENT)
Age: 38
End: 2018-02-15

## 2018-02-15 ENCOUNTER — HOSPITAL ENCOUNTER (OUTPATIENT)
Facility: AMBULATORY SURGERY CENTER | Age: 38
End: 2018-02-15
Attending: SURGERY
Payer: COMMERCIAL

## 2018-02-15 VITALS
HEIGHT: 64 IN | OXYGEN SATURATION: 98 % | SYSTOLIC BLOOD PRESSURE: 109 MMHG | BODY MASS INDEX: 25.61 KG/M2 | DIASTOLIC BLOOD PRESSURE: 70 MMHG | TEMPERATURE: 98.3 F | RESPIRATION RATE: 16 BRPM | WEIGHT: 150 LBS

## 2018-02-15 RX ORDER — ONDANSETRON 4 MG/1
4 TABLET, ORALLY DISINTEGRATING ORAL EVERY 30 MIN PRN
Status: DISCONTINUED | OUTPATIENT
Start: 2018-02-15 | End: 2018-02-16 | Stop reason: HOSPADM

## 2018-02-15 RX ORDER — FENTANYL CITRATE 50 UG/ML
25-50 INJECTION, SOLUTION INTRAMUSCULAR; INTRAVENOUS
Status: DISCONTINUED | OUTPATIENT
Start: 2018-02-15 | End: 2018-02-16 | Stop reason: HOSPADM

## 2018-02-15 RX ORDER — GABAPENTIN 300 MG/1
CAPSULE ORAL
COMMUNITY
Start: 2017-09-26

## 2018-02-15 RX ORDER — SODIUM CHLORIDE, SODIUM LACTATE, POTASSIUM CHLORIDE, CALCIUM CHLORIDE 600; 310; 30; 20 MG/100ML; MG/100ML; MG/100ML; MG/100ML
INJECTION, SOLUTION INTRAVENOUS CONTINUOUS PRN
Status: DISCONTINUED | OUTPATIENT
Start: 2018-02-15 | End: 2018-02-15

## 2018-02-15 RX ORDER — FENTANYL CITRATE 50 UG/ML
INJECTION, SOLUTION INTRAMUSCULAR; INTRAVENOUS PRN
Status: DISCONTINUED | OUTPATIENT
Start: 2018-02-15 | End: 2018-02-15

## 2018-02-15 RX ORDER — SODIUM CHLORIDE, SODIUM LACTATE, POTASSIUM CHLORIDE, CALCIUM CHLORIDE 600; 310; 30; 20 MG/100ML; MG/100ML; MG/100ML; MG/100ML
INJECTION, SOLUTION INTRAVENOUS CONTINUOUS
Status: DISCONTINUED | OUTPATIENT
Start: 2018-02-15 | End: 2018-02-16 | Stop reason: HOSPADM

## 2018-02-15 RX ORDER — FENTANYL CITRATE 50 UG/ML
25-50 INJECTION, SOLUTION INTRAMUSCULAR; INTRAVENOUS
Status: DISCONTINUED | OUTPATIENT
Start: 2018-02-15 | End: 2018-02-15 | Stop reason: HOSPADM

## 2018-02-15 RX ORDER — ONDANSETRON 2 MG/ML
4 INJECTION INTRAMUSCULAR; INTRAVENOUS EVERY 30 MIN PRN
Status: DISCONTINUED | OUTPATIENT
Start: 2018-02-15 | End: 2018-02-16 | Stop reason: HOSPADM

## 2018-02-15 RX ORDER — PROPOFOL 10 MG/ML
INJECTION, EMULSION INTRAVENOUS PRN
Status: DISCONTINUED | OUTPATIENT
Start: 2018-02-15 | End: 2018-02-15

## 2018-02-15 RX ORDER — LIDOCAINE 40 MG/G
CREAM TOPICAL
Status: DISCONTINUED | OUTPATIENT
Start: 2018-02-15 | End: 2018-02-15 | Stop reason: HOSPADM

## 2018-02-15 RX ORDER — NALOXONE HYDROCHLORIDE 0.4 MG/ML
.1-.4 INJECTION, SOLUTION INTRAMUSCULAR; INTRAVENOUS; SUBCUTANEOUS
Status: DISCONTINUED | OUTPATIENT
Start: 2018-02-15 | End: 2018-02-16 | Stop reason: HOSPADM

## 2018-02-15 RX ORDER — PROPOFOL 10 MG/ML
INJECTION, EMULSION INTRAVENOUS CONTINUOUS PRN
Status: DISCONTINUED | OUTPATIENT
Start: 2018-02-15 | End: 2018-02-15

## 2018-02-15 RX ORDER — HYDROMORPHONE HYDROCHLORIDE 1 MG/ML
.3-.5 INJECTION, SOLUTION INTRAMUSCULAR; INTRAVENOUS; SUBCUTANEOUS EVERY 10 MIN PRN
Status: DISCONTINUED | OUTPATIENT
Start: 2018-02-15 | End: 2018-02-16 | Stop reason: HOSPADM

## 2018-02-15 RX ORDER — DEXAMETHASONE SODIUM PHOSPHATE 10 MG/ML
INJECTION, SOLUTION INTRAMUSCULAR; INTRAVENOUS PRN
Status: DISCONTINUED | OUTPATIENT
Start: 2018-02-15 | End: 2018-02-15

## 2018-02-15 RX ORDER — LIDOCAINE HYDROCHLORIDE 20 MG/ML
INJECTION, SOLUTION INFILTRATION; PERINEURAL PRN
Status: DISCONTINUED | OUTPATIENT
Start: 2018-02-15 | End: 2018-02-15

## 2018-02-15 RX ORDER — ONDANSETRON 2 MG/ML
INJECTION INTRAMUSCULAR; INTRAVENOUS PRN
Status: DISCONTINUED | OUTPATIENT
Start: 2018-02-15 | End: 2018-02-15

## 2018-02-15 RX ORDER — MEPERIDINE HYDROCHLORIDE 25 MG/ML
12.5 INJECTION INTRAMUSCULAR; INTRAVENOUS; SUBCUTANEOUS
Status: DISCONTINUED | OUTPATIENT
Start: 2018-02-15 | End: 2018-02-16 | Stop reason: HOSPADM

## 2018-02-15 RX ADMIN — FENTANYL CITRATE 50 MCG: 50 INJECTION, SOLUTION INTRAMUSCULAR; INTRAVENOUS at 11:31

## 2018-02-15 RX ADMIN — PROPOFOL 60 MG: 10 INJECTION, EMULSION INTRAVENOUS at 11:32

## 2018-02-15 RX ADMIN — DEXAMETHASONE SODIUM PHOSPHATE 4 MG: 10 INJECTION, SOLUTION INTRAMUSCULAR; INTRAVENOUS at 11:35

## 2018-02-15 RX ADMIN — SODIUM CHLORIDE, SODIUM LACTATE, POTASSIUM CHLORIDE, CALCIUM CHLORIDE: 600; 310; 30; 20 INJECTION, SOLUTION INTRAVENOUS at 11:17

## 2018-02-15 RX ADMIN — ONDANSETRON 4 MG: 2 INJECTION INTRAMUSCULAR; INTRAVENOUS at 11:35

## 2018-02-15 RX ADMIN — LIDOCAINE HYDROCHLORIDE 70 MG: 20 INJECTION, SOLUTION INFILTRATION; PERINEURAL at 11:25

## 2018-02-15 RX ADMIN — PROPOFOL 140 MG: 10 INJECTION, EMULSION INTRAVENOUS at 11:25

## 2018-02-15 RX ADMIN — PROPOFOL 200 MCG/KG/MIN: 10 INJECTION, EMULSION INTRAVENOUS at 11:25

## 2018-02-15 RX ADMIN — FENTANYL CITRATE 50 MCG: 50 INJECTION, SOLUTION INTRAMUSCULAR; INTRAVENOUS at 11:36

## 2018-02-15 NOTE — DISCHARGE INSTRUCTIONS
[facility]  Home Care Following Anesthesia  For 24 hours after surgery:  1. Get plenty of rest.  A responsible adult must stay with you for at least 24 hours after you leave the surgery center.  2. Do not drive or use heavy equipment.  If you have weakness or tingling, don't drive or use heavy equipment until this feeling goes away.   3. Do not drink alcohol.   4. Avoid strenuous or risky activities.  Ask for help when climbing stairs.  5. You may feel lightheaded.  IF so, sit for a few minutes before standing.  Have someone help you get up.   6. If you have nausea (feel sick to your stomach): Drink only clear liquids such as apple juice, ginger ale, broth or 7-Up.  Rest may also help.  Be sure to drink enough fluids.  Move to a regular diet as you feel able.   7. You may have a slight fever.  Call the doctor if your fever is over 100 F (37.7 C) (taken under the tongue) or lasts longer than 24 hours.  8. You may have a dry mouth, a sore throat, muscle aches or trouble sleeping. These should go away after 24 hours.  9. Do not make important or legal decisions.   If you use hormonal birth control (such as the pill, patch, ring or implants):  You will need a second form of birth control for 7 days (condoms, a diaphragm or contraceptive foam).  While in the surgery center, you received a medicine called Sugammadex.  Hormonal birth control (such as the pill, patch, ring or implants) will not work as well for a week after taking this medicine.    Tips for taking pain medications  To get the best pain relief possible, remember these points:    Take pain medications as directed, before pain becomes severe.    Pain medication can upset your stomach: taking it with food may help.    Constipation is a common side effect of pain medication. Drink plenty of  fluids.    Eat foods high in fiber. Take a stool softener if recommended by your doctor or pharmacist.    Do not drink alcohol, drive  or operate machinery while taking pain medications.    Ask about other ways to control pain, such as with heat, ice or relaxation.    Tylenol/Acetaminophen Consumption  To help encourage the safe use of acetaminophen, the makers of TYLENOL  have lowered the maximum daily dose for single-ingredient Extra Strength TYLENOL  (acetaminophen) products sold in the [place] from 8 pills per day (4,000 mg) to 6 pills per day (3,000 mg). The dosing interval has also changed from 2 pills every 4-6 hours to 2 pills every 6 hours.    If you feel your pain relief is insufficient, you may take Tylenol/Acetaminophen in addition to your narcotic pain medication.     Be careful not to exceed 3,000 mg of Tylenol/Acetaminophen in a 24 hour period from all sources.    If you are taking extra strength Tylenol/acetaminophen (500 mg), the maximum dose is 6 tablets in 24 hours.    If you are taking regular strength acetaminophen (325 mg), the maximum dose is 9 tablets in 24 hours.    Call a doctor for any of the followin. Signs of infection (fever, growing tenderness at the surgery site, a large amount of drainage or bleeding, severe pain, foul-smelling drainage, redness, swelling).  2. It has been over 8 to 10 hours since surgery and you are still not able to urinate (pass water).  3. Headache for over 24 hours.  Your doctor is:  Dr. Karlos Randall, General Surgery: [phone]  Or dial [phone] and ask for the resident on call for:  General Surgery  For emergency care, call the:  [facility]:  [phone] (TTY for hearing impaired: [phone])

## 2018-02-15 NOTE — ANESTHESIA CARE TRANSFER NOTE
Patient: Jessica Mckeon    Procedure(s):  Upper Endoscopy with Removal of Intragastric Balloon - Wound Class: II-Clean Contaminated    Diagnosis: Treatment Completion  Diagnosis Additional Information: No value filed.    Anesthesia Type:   General, ETT     Note:  Airway :Face Mask  Patient transferred to:PACU  Comments: Uneventful transport to PACU; VSS; Report given to RN; Pt comfortable; IV patent: pt exchanging wellHandoff Report: Identifed the Patient, Identified the Reponsible Provider, Reviewed the pertinent medical history, Discussed the surgical course, Reviewed Intra-OP anesthesia mangement and issues during anesthesia, Set expectations for post-procedure period and Allowed opportunity for questions and acknowledgement of understanding      Vitals: (Last set prior to Anesthesia Care Transfer)    CRNA VITALS  2/15/2018 1129 - 2/15/2018 1206      2/15/2018             EKG: Sinus rhythm                Electronically Signed By: LUIS A Quijano CRNA  February 15, 2018  12:06 PM

## 2018-02-15 NOTE — IP AVS SNAPSHOT
MRN:[id number]                      After Visit Summary   2/15/2018    Jessica Mckeon    MRN: [id number]           Thank you!     Thank you for choosing [facility] for your care. Our goal is always to provide you with excellent care. Hearing back from our patients is one way we can continue to improve our services. Please take a few minutes to complete the written survey that you may receive in the mail after you visit with us. Thank you!        Patient Information     Date Of Birth          1980        About your hospital stay     You were admitted on:  February 15, 2018 You last received care in the:  [facility]    You were discharged on:  February 15, 2018       Who to Call     For medical emergencies, please call 911.  For non-urgent questions about your medical care, please call your primary care provider or clinic, [phone]  For questions related to your surgery, please call your surgery clinic        Attending Provider     Provider Specialty    Karlos Randall MD Surgery       Primary Care Provider Office Phone # Fax #    Jose Guadalupe Luevnao [phone]      Further instructions from your care team       [facility]  Home Care Following Anesthesia  For 24 hours after surgery:  1. Get plenty of rest.  A responsible adult must stay with you for at least 24 hours after you leave the surgery center.  2. Do not drive or use heavy equipment.  If you have weakness or tingling, don't drive or use heavy equipment until this feeling goes away.   3. Do not drink alcohol.   4. Avoid strenuous or risky activities.  Ask for help when climbing stairs.  5. You may feel lightheaded.  IF so, sit for a few minutes before standing.  Have someone help you get up.   6. If you have nausea (feel sick to your stomach): Drink only clear liquids such as apple juice, ginger ale, broth or 7-Up.  Rest may also help.  Be sure to  drink enough fluids.  Move to a regular diet as you feel able.   7. You may have a slight fever.  Call the doctor if your fever is over 100 F (37.7 C) (taken under the tongue) or lasts longer than 24 hours.  8. You may have a dry mouth, a sore throat, muscle aches or trouble sleeping. These should go away after 24 hours.  9. Do not make important or legal decisions.   If you use hormonal birth control (such as the pill, patch, ring or implants):  You will need a second form of birth control for 7 days (condoms, a diaphragm or contraceptive foam).  While in the surgery center, you received a medicine called Sugammadex.  Hormonal birth control (such as the pill, patch, ring or implants) will not work as well for a week after taking this medicine.    Tips for taking pain medications  To get the best pain relief possible, remember these points:    Take pain medications as directed, before pain becomes severe.    Pain medication can upset your stomach: taking it with food may help.    Constipation is a common side effect of pain medication. Drink plenty of  fluids.    Eat foods high in fiber. Take a stool softener if recommended by your doctor or pharmacist.    Do not drink alcohol, drive or operate machinery while taking pain medications.    Ask about other ways to control pain, such as with heat, ice or relaxation.    Tylenol/Acetaminophen Consumption  To help encourage the safe use of acetaminophen, the makers of TYLENOL  have lowered the maximum daily dose for single-ingredient Extra Strength TYLENOL  (acetaminophen) products sold in the [place] from 8 pills per day (4,000 mg) to 6 pills per day (3,000 mg). The dosing interval has also changed from 2 pills every 4-6 hours to 2 pills every 6 hours.    If you feel your pain relief is insufficient, you may take Tylenol/Acetaminophen in addition to your narcotic pain medication.     Be careful not to exceed 3,000 mg of Tylenol/Acetaminophen in a 24 hour period from all  "sources.    If you are taking extra strength Tylenol/acetaminophen (500 mg), the maximum dose is 6 tablets in 24 hours.    If you are taking regular strength acetaminophen (325 mg), the maximum dose is 9 tablets in 24 hours.    Call a doctor for any of the followin. Signs of infection (fever, growing tenderness at the surgery site, a large amount of drainage or bleeding, severe pain, foul-smelling drainage, redness, swelling).  2. It has been over 8 to 10 hours since surgery and you are still not able to urinate (pass water).  3. Headache for over 24 hours.  Your doctor is:  Dr. Karlos Randall, General Surgery: [phone]  Or dial [phone] and ask for the resident on call for:  General Surgery  For emergency care, call the:  [facility]:  [phone] (TTY for hearing impaired: [phone])    Pending Results     No orders found from 2018 to 2018.            Admission Information     Date & Time Provider Department Dept. Phone    2/15/2018 Karlos Randall MD [facility] [phone]      Your Vitals Were     Blood Pressure Temperature Respirations Height Weight Pulse Oximetry    107/70 97.5  F (36.4  C) (Temporal) 18 1.626 m (5' 4\") 68 kg (150 lb) 99%    BMI (Body Mass Index)                   25.75 kg/m2           InstaEDU Information     Insta[employer]U is an electronic gateway that provides easy, online access to your medical records. With InstaEDU, you can request a clinic appointment, read your test results, renew a prescription or communicate with your care team.     To sign up for Inst[employer]U visit the website at [email or web address]   You will be asked to enter the access code listed below, as well as some personal information. Please follow the directions to create your username and password.     Your access code is: [email or web address]  Expires: 2018  9:26 AM     Your access code will  in 90 days. If you need help or a new code, please " contact your [facility] or call [phone] for assistance.        Equal Access to Services     APRIL CHAUDHARY : Hadii don Aguirre, marcelina casillas, katieesequiel contrerasrobynmarianne orellana, chantelle rosalessaritaamara garber. So [facility] [phone].    ATENCIÓN: Si habla español, tiene a alejandro disposición servicios gratuitos de asistencia lingüística. Llame al [phone].    We comply with applicable federal civil rights laws and [place] laws. We do not discriminate on the basis of race, color, national origin, age, disability, sex, sexual orientation, or gender identity.               Review of your medicines      UNREVIEWED medicines. Ask your doctor about these medicines        Dose / Directions    EPIPEN 2-KHANG 0.3 MG/0.3ML injection 2-pack   Generic drug:  EPINEPHrine        Refills:  0       ferrous sulfate 325 (65 FE) MG tablet   Commonly known as:  IRON        Take by mouth daily (with breakfast)   Refills:  0       fish oil-omega-3 fatty acids 1000 MG capsule        Dose:  900 mg   Take 900 mg by mouth daily   Refills:  0       gabapentin 300 MG capsule   Commonly known as:  NEURONTIN        Take 1 capsule daily on day 1, then 1 capsule twice daily on day 2,then 1 capsule  3 times daily therafter.   Refills:  0       omeprazole 40 MG capsule   Commonly known as:  priLOSEC   Used for:  Obesity (BMI 30.0-34.9)        Dose:  40 mg   Take 1 capsule (40 mg) by mouth daily   Quantity:  30 capsule   Refills:  1       vitamin D3 2000 UNITS Caps        Dose:  4000 Units   Take 4,000 Units by mouth daily   Refills:  0                Protect others around you: Learn how to safely use, store and throw away your medicines at www.disposemymeds.org.             Medication List: This is a list of all your medications and when to take them. Check marks below indicate your daily home schedule. Keep this list as a reference.      Medications           Morning Afternoon Evening Bedtime As  Needed    EPIPEN 2-KHANG 0.3 MG/0.3ML injection 2-pack   Generic drug:  EPINEPHrine                                ferrous sulfate 325 (65 FE) MG tablet   Commonly known as:  IRON   Take by mouth daily (with breakfast)                                fish oil-omega-3 fatty acids 1000 MG capsule   Take 900 mg by mouth daily                                gabapentin 300 MG capsule   Commonly known as:  NEURONTIN   Take 1 capsule daily on day 1, then 1 capsule twice daily on day 2,then 1 capsule  3 times daily therafter.                                omeprazole 40 MG capsule   Commonly known as:  priLOSEC   Take 1 capsule (40 mg) by mouth daily                                vitamin D3 2000 UNITS Caps   Take 4,000 Units by mouth daily                                   Génesis Hawkins  [phone]

## 2018-02-15 NOTE — ANESTHESIA POSTPROCEDURE EVALUATION
Patient: Jessica Mckeon    Procedure(s):  Upper Endoscopy with Removal of Intragastric Balloon - Wound Class: II-Clean Contaminated    Diagnosis:Treatment Completion  Diagnosis Additional Information: No value filed.    Anesthesia Type:  General, ETT    Note:  Anesthesia Post Evaluation    Patient location during evaluation: Phase 2  Patient participation: Able to fully participate in evaluation  Level of consciousness: awake and alert  Pain management: adequate  Airway patency: patent  Cardiovascular status: acceptable and hemodynamically stable  Respiratory status: acceptable  Hydration status: acceptable  PONV: none     Anesthetic complications: None          Last vitals:  Vitals:    02/15/18 0908 02/15/18 1201 02/15/18 1215   BP: 108/74 104/75 107/70   Resp: 16 18    Temp: 36.3  C (97.4  F) 36.6  C (97.9  F) 36.4  C (97.5  F)   SpO2: 98% 100% 99%         Electronically Signed By: Damion Zapata MD  February 15, 2018  12:18 PM

## 2018-02-15 NOTE — IP AVS SNAPSHOT
[facility]    [address] [place] [id number]    Phone:  [phone]    Fax:  [phone]                                       After Visit Summary   2/15/2018    Jessica Mckeon    MRN: [id number]           After Visit Summary Signature Page     I have received my discharge instructions, and my questions have been answered. I have discussed any challenges I see with this plan with the nurse or doctor.    ..........................................................................................................................................  Patient/Patient Representative Signature      ..........................................................................................................................................  Patient Representative Print Name and Relationship to Patient    ..................................................               ................................................  Date                                            Time    ..........................................................................................................................................  Reviewed by Signature/Title    ...................................................              ..............................................  Date                                                            Time

## 2018-02-15 NOTE — OP NOTE
[facility]    Operative Note    Pre-operative diagnosis: Treatment Completion   Post-operative diagnosis * No post-op diagnosis entered *   Procedure: Procedure(s):  Upper Endoscopy with Removal of Intragastric Balloon - Wound Class: II-Clean Contaminated   Surgeon: Surgeon(s) and Role:     * Karlos Randall MD - Primary   Anesthesia: General    Estimated blood loss: 0 mL   Drains: None   Specimens: * No specimens in log *   Findings: None.   Complications: None.   Implants: None.       COMORBIDITIES:   Past Medical History:   Diagnosis Date     Obesity      Uncomplicated asthma        OPERATIVE INDICATIONS: Jessica Mckeon is a 38 year old female who has been obese and underwent prior ORBERA implant 6 months ago.    Wt Readings from Last 4 Encounters:   02/15/18 150 lb   07/13/17 180 lb   06/15/17 182 lb 8 oz       Risks and benefits of surgery were fully understood.    The need for removal of the balloon after 6 months was fully understood as well.    The high likelihood of side effects with the balloon in place were addressed beforehand.    OPERATIVE PROCEDURE:    A timeout procedure was performed.  The patient was positioned and general anesthesia with endotracheal tube was delivered by CRNA.  Oral bite block was placed and gastroscopy was performed; the intragastric balloon was noted and was intact.   I used the ORBERA tool to puncture the balloon and removed about 700cc saline under direct visualization.  The balloon deflated normally.  I used the fish hook forceps to grasp the balloon and pulled this out under direct visualization.  I had anesthesia take down the ETT cuff while removing through the nasopharynx.  The procedure was complete.    I was present for all critical parts of the procedure.    Karlos Randall MD  Surgery  [phone] (hospital )  [phone] (clinic nurses)

## 2018-02-15 NOTE — ANESTHESIA PREPROCEDURE EVALUATION
Anesthesia Evaluation     . Pt has had prior anesthetic. Type: General    No history of anesthetic complications          ROS/MED HX    ENT/Pulmonary:     (+)Intermittent asthma Treatment: Inhaler prn,  , . .    Neurologic:  - neg neurologic ROS     Cardiovascular:  - neg cardiovascular ROS   (+) ----. : . . . :. . No previous cardiac testing       METS/Exercise Tolerance:  >4 METS   Hematologic:  - neg hematologic  ROS       Musculoskeletal:  - neg musculoskeletal ROS       GI/Hepatic:     (+) GERD Asymptomatic on medication,       Renal/Genitourinary:  - ROS Renal section negative       Endo:  - neg endo ROS       Psychiatric:  - neg psychiatric ROS       Infectious Disease:  - neg infectious disease ROS       Malignancy:      - no malignancy   Other:    - neg other ROS                 Physical Exam  Normal systems: pulmonary and dental    Airway   Mallampati: II  TM distance: >3 FB  Neck ROM: full    Dental     Cardiovascular   Rhythm and rate: regular and normal      Pulmonary                        Lab / Radiology Results:   Reviewed current labs when avail, see EMR for details.      BMP:  No results for input(s): NA, POTASSIUM, CHLORIDE, CO2, BUN, CR, GLC, AYAH in the last 44244 hours.    Invalid input(s): MG    LFTs:   No results for input(s): PROTTOTAL, ALBUMIN, BILITOTAL, ALKPHOS, AST, ALT, BILIDIRECT in the last 05213 hours.    CBC:   No results for input(s): WBC, HGB, PLT in the last 77060 hours.    Coags:  No results for input(s): INR, PTT, FIBR in the last 61274 hours.    Blood Bank:  No results found for: ABO, RH, AS    Studies:  See EMR for current studies, reviewed when available.      Anesthesia Plan      History & Physical Review  History and physical reviewed and following examination; no interval change.    ASA Status:  2 .    NPO Status:  > 8 hours    Plan for General and ETT with Intravenous induction. Maintenance will be TIVA.    PONV prophylaxis:  Ondansetron (or other 5HT-3)        Postoperative Care  Postoperative pain management:  Multi-modal analgesia.      Consents  Anesthetic plan, risks, benefits and alternatives discussed with:  Patient.  Use of blood products discussed: No .   .      Damion Zapata MD  Anesthesiologist  10:28 AM  February 15, 2018                        .

## 2018-03-08 ENCOUNTER — CARE COORDINATION (OUTPATIENT)
Dept: SURGERY | Facility: CLINIC | Age: 38
End: 2018-03-08

## 2018-03-08 NOTE — PROGRESS NOTES
Patient notified that per Dr. Randall she can go back on omeprazole, also advised her per Luzma PLUMMER. She should try and taper off and still having problem she should follow up with primary. Patient ok with this plan.

## 2018-03-16 ENCOUNTER — TELEPHONE (OUTPATIENT)
Dept: SURGERY | Facility: CLINIC | Age: 38
End: 2018-03-16

## 2018-03-16 NOTE — TELEPHONE ENCOUNTER
, Dr Polk left message with coordinator that after the gastric balloon was removed on 2/15/18, they received a bill from Anesthesia, [facility] was billed and money was taken from there HSA account.  They had already self paid for this procedure in July 2017 (for placement and removal).   Coordinator notified Maria R Sanchez in [phone],  and Romario Alas, financial counselor, ph [phone] regarding patient concerns this week.  Plan:  Maria R or Romario to followup with Dr Polk.    Coordinator called Dr Polk today, he states no one has followed up with him as yet.   Informed him that Maria R or Romario may reach out to him  Monday if they are unable to call him today.

## 2021-09-23 NOTE — DISCHARGE INSTRUCTIONS
[facility]  Home Care Following Anesthesia  For 24 hours after surgery:  1. Get plenty of rest.  A responsible adult must stay with you for at least 24 hours after you leave the surgery center.  2. Do not drive or use heavy equipment.  If you have weakness or tingling, don't drive or use heavy equipment until this feeling goes away.   3. Do not drink alcohol.   4. Avoid strenuous or risky activities.  Ask for help when climbing stairs.  5. You may feel lightheaded.  IF so, sit for a few minutes before standing.  Have someone help you get up.   6. If you have nausea (feel sick to your stomach): Drink only clear liquids such as apple juice, ginger ale, broth or 7-Up.  Rest may also help.  Be sure to drink enough fluids.  Move to a regular diet as you feel able.   7. You may have a slight fever.  Call the doctor if your fever is over 100 F (37.7 C) (taken under the tongue) or lasts longer than 24 hours.  8. You may have a dry mouth, a sore throat, muscle aches or trouble sleeping. These should go away after 24 hours.  9. Do not make important or legal decisions.         Tips for taking pain medications  To get the best pain relief possible, remember these points:    Take pain medications as directed, before pain becomes severe.    Pain medication can upset your stomach: taking it with food may help.    Constipation is a common side effect of pain medication. Drink plenty of  fluids.    Eat foods high in fiber. Take a stool softener if recommended by your doctor or pharmacist.    Do not drink alcohol, drive or operate machinery while taking pain medications.    Ask about other ways to control pain, such as with heat, ice or relaxation.    Call a doctor for any of the followin. Signs of infection (fever, growing tenderness at the surgery site, a large amount of drainage or bleeding, severe pain, foul-smelling drainage, redness, swelling).  2. It has been over 8 to 10 hours since  surgery and you are still not able to urinate (pass water).  3. Headache for over 24 hours.    Your doctor is:       Dr. Karlos Randall: [phone]                Or dial [phone] and ask for the resident on call for:  General Surgery  For emergency care, call the:  [facility]:  [phone] (TTY for hearing impaired: [phone])               Mohs Method Verbiage: An incision at a 45 degree angle following the standard Mohs approach was done and the specimen was harvested as a microscopic controlled layer.

## (undated) DEVICE — SOL WATER IRRIG 1000ML BOTTLE 2F7114

## (undated) DEVICE — SYR 50ML LL W/O NDL 309653

## (undated) DEVICE — ENDO BITE BLOCK ADULT OMNI-BLOC

## (undated) DEVICE — KIT ENDO FIRST STEP DISINFECTANT 200ML W/POUCH EP-4

## (undated) DEVICE — ENDO TUBING INSUFFLATOR CO2 EFFICIENT 710324

## (undated) DEVICE — Device

## (undated) DEVICE — TUBING SUCTION 12"X1/4" N612

## (undated) DEVICE — SOL NACL 0.9% IRRIG 1000ML BOTTLE 2F7124

## (undated) DEVICE — GLOVE PROTEXIS POWDER FREE SMT 7.5  2D72PT75X

## (undated) DEVICE — ENDO CAP AND TUBING STERILE FOR ENDOGATOR  100130

## (undated) DEVICE — SYR 10ML LL W/O NDL

## (undated) DEVICE — SPONGE RAY-TEC 4X8" 7318

## (undated) DEVICE — LINEN TOWEL PACK X5 5464

## (undated) DEVICE — LABEL MEDICATION SYSTEM 3303-P

## (undated) DEVICE — SUCTION MANIFOLD NEPTUNE 2 SYS 4 PORT 0702-020-000

## (undated) DEVICE — JELLY LUBRICATING SURGILUBE 2OZ TUBE

## (undated) DEVICE — PACK ENT ENDOSCOPY CUSTOM ASC

## (undated) DEVICE — ENDO TUBING CO2 SMARTCAP STERILE DISP 100145CO2EXT

## (undated) DEVICE — KIT CONNECTOR FOR OLYMPUS ENDOSCOPES DEFENDO 100310

## (undated) RX ORDER — PROPOFOL 10 MG/ML
INJECTION, EMULSION INTRAVENOUS
Status: DISPENSED
Start: 2017-07-13

## (undated) RX ORDER — DEXAMETHASONE SODIUM PHOSPHATE 4 MG/ML
INJECTION, SOLUTION INTRA-ARTICULAR; INTRALESIONAL; INTRAMUSCULAR; INTRAVENOUS; SOFT TISSUE
Status: DISPENSED
Start: 2017-07-13

## (undated) RX ORDER — ACETAMINOPHEN 325 MG/1
TABLET ORAL
Status: DISPENSED
Start: 2017-07-13

## (undated) RX ORDER — FENTANYL CITRATE 50 UG/ML
INJECTION, SOLUTION INTRAMUSCULAR; INTRAVENOUS
Status: DISPENSED
Start: 2018-02-15

## (undated) RX ORDER — FENTANYL CITRATE 50 UG/ML
INJECTION, SOLUTION INTRAMUSCULAR; INTRAVENOUS
Status: DISPENSED
Start: 2017-07-13

## (undated) RX ORDER — PROPOFOL 10 MG/ML
INJECTION, EMULSION INTRAVENOUS
Status: DISPENSED
Start: 2018-02-15

## (undated) RX ORDER — DEXAMETHASONE SODIUM PHOSPHATE 4 MG/ML
INJECTION, SOLUTION INTRA-ARTICULAR; INTRALESIONAL; INTRAMUSCULAR; INTRAVENOUS; SOFT TISSUE
Status: DISPENSED
Start: 2018-02-15

## (undated) RX ORDER — GLYCOPYRROLATE 0.2 MG/ML
INJECTION INTRAMUSCULAR; INTRAVENOUS
Status: DISPENSED
Start: 2017-07-13

## (undated) RX ORDER — GABAPENTIN 300 MG/1
CAPSULE ORAL
Status: DISPENSED
Start: 2017-07-13

## (undated) RX ORDER — ONDANSETRON 2 MG/ML
INJECTION INTRAMUSCULAR; INTRAVENOUS
Status: DISPENSED
Start: 2018-02-15

## (undated) RX ORDER — ONDANSETRON 2 MG/ML
INJECTION INTRAMUSCULAR; INTRAVENOUS
Status: DISPENSED
Start: 2017-07-13